# Patient Record
(demographics unavailable — no encounter records)

---

## 2025-01-08 NOTE — HISTORY OF PRESENT ILLNESS
[FreeTextEntry1] : follow up  [de-identified] : 61 year old here for back pain, patient had multiple back surgeries in the past and is currently in PT and getting an epidural every 3 mo. patient looking for a note to have breast reduction surgery due to back pain. patient would also like to restart zepbound. patient denies any chest pain no sob no abdominal pain.

## 2025-04-17 NOTE — PHYSICAL EXAM
[No Acute Distress] : no acute distress [Well Nourished] : well nourished [Well Developed] : well developed [PERRL] : pupils equal round and reactive to light [Normal Outer Ear/Nose] : the outer ears and nose were normal in appearance [Normal Oropharynx] : the oropharynx was normal [No JVD] : no jugular venous distention [No Respiratory Distress] : no respiratory distress  [No Accessory Muscle Use] : no accessory muscle use [Clear to Auscultation] : lungs were clear to auscultation bilaterally [Normal Rate] : normal rate  [Regular Rhythm] : with a regular rhythm [Normal S1, S2] : normal S1 and S2 [No Edema] : there was no peripheral edema [Soft] : abdomen soft [Non Tender] : non-tender [No Rash] : no rash [Coordination Grossly Intact] : coordination grossly intact [Normal Gait] : normal gait [Normal Affect] : the affect was normal [Normal Insight/Judgement] : insight and judgment were intact normal...

## 2025-04-17 NOTE — PHYSICAL EXAM
[No Acute Distress] : no acute distress [Well Nourished] : well nourished [Well Developed] : well developed [PERRL] : pupils equal round and reactive to light [Normal Outer Ear/Nose] : the outer ears and nose were normal in appearance [Normal Oropharynx] : the oropharynx was normal [No JVD] : no jugular venous distention [No Respiratory Distress] : no respiratory distress  [No Accessory Muscle Use] : no accessory muscle use [Clear to Auscultation] : lungs were clear to auscultation bilaterally [Normal Rate] : normal rate  [Regular Rhythm] : with a regular rhythm [Normal S1, S2] : normal S1 and S2 [No Edema] : there was no peripheral edema [Soft] : abdomen soft [Non Tender] : non-tender [No Rash] : no rash [Coordination Grossly Intact] : coordination grossly intact [Normal Gait] : normal gait [Normal Affect] : the affect was normal [Normal Insight/Judgement] : insight and judgment were intact

## 2025-04-18 NOTE — HISTORY OF PRESENT ILLNESS
[FreeTextEntry1] :  I am here for medical clearance. I am having breast reconstruction.  [de-identified] :  Ms. Mesa presents today for medical clearance for impending surgery. She informs that she is feeling well, no acute distress or complaints. Stopped Zepbound 2 weeks ago. Not a smoker. Drinks socially. Had mammo in January (results scanned in)

## 2025-04-18 NOTE — HISTORY OF PRESENT ILLNESS
[FreeTextEntry1] :  I am here for medical clearance. I am having breast reconstruction.  [de-identified] :  Ms. Mesa presents today for medical clearance for impending surgery. She informs that she is feeling well, no acute distress or complaints. Stopped Zepbound 2 weeks ago. Not a smoker. Drinks socially. Had mammo in January (results scanned in)

## 2025-04-18 NOTE — PLAN
[FreeTextEntry1] : Labs and EKG reviewed and are within normal limits  Pt. is medically cleared for impending breast reduction surgery

## 2025-04-18 NOTE — HISTORY OF PRESENT ILLNESS
[FreeTextEntry1] :  I am here for medical clearance. I am having breast reconstruction.  [de-identified] :  Ms. Mesa presents today for medical clearance for impending surgery. She informs that she is feeling well, no acute distress or complaints. Stopped Zepbound 2 weeks ago. Not a smoker. Drinks socially. Had mammo in January (results scanned in)

## 2025-07-09 NOTE — HEALTH RISK ASSESSMENT
[Good] : ~his/her~  mood as  good [Yes] : Yes [Monthly or less (1 pt)] : Monthly or less (1 point) [1 or 2 (0 pts)] : 1 or 2 (0 points) [Never (0 pts)] : Never (0 points) [Patient reported mammogram was normal] : Patient reported mammogram was normal [Patient reported PAP Smear was normal] : Patient reported PAP Smear was normal [With Family] : lives with family [Employed] : employed [ColonoscopyComments] : due normal last time

## 2025-07-09 NOTE — HISTORY OF PRESENT ILLNESS
[FreeTextEntry1] : annual  [de-identified] : 61-year-old female here for annual well visit. patient pushed her surgery back til sept due to aptt lab being elevated saw hematology everything was normal. patient feels well overall has a new grandbaby keeping busy. doing well with zepbound would like to increase to 7.5 denies any side effects no chest pain no sob no abdominal or urinary issues.